# Patient Record
Sex: MALE | URBAN - METROPOLITAN AREA
[De-identification: names, ages, dates, MRNs, and addresses within clinical notes are randomized per-mention and may not be internally consistent; named-entity substitution may affect disease eponyms.]

---

## 2022-02-28 ENCOUNTER — IMPORTED ENCOUNTER (OUTPATIENT)
Dept: URBAN - METROPOLITAN AREA CLINIC 31 | Facility: CLINIC | Age: 27
End: 2022-02-28

## 2022-02-28 PROBLEM — H52.10: Noted: 2022-02-28

## 2022-02-28 PROCEDURE — 76514 ECHO EXAM OF EYE THICKNESS: CPT

## 2022-02-28 PROCEDURE — 92025 CPTRIZED CORNEAL TOPOGRAPHY: CPT

## 2022-02-28 NOTE — PATIENT DISCUSSION
We discussed risks vs. benefits of surgery. The patient has watched and understands the informed consent video. I have discussed the risks of laser refractive surgery with the patient. I informed the patient of the risk of infection (1:1000 for PRK and 1:3000 to 1:5000 for LASIK.)  I informed the patient of the possibility of complications related to creating the flap during LASIK surgery and that such complications may require that completion of their procedure be delayed for months. I have discussed the possibility of postoperative halos starbursts and dryness. I reviewed the risk of corneal ectasia. We discussed the possibility of need for enhancement surgery. I have discussed the need for postoperative reading glasses. I have discussed the possibility for the patient to require glasses and/or contact lenses postoperatively for the clearest vision possible. I have answered all of the patients questions. SCHEDULE PRK OU WITH AESPOD 1 and 3 with [facility] follow up with Jakub Headings - future increase in risk of POAG discussed. E-FORCE RUN TODAY. ( RX of Valium and Norco given today). Pt will call to schedule procedure.

## 2022-04-02 ASSESSMENT — VISUAL ACUITY
OD_SC: J1+
OS_SC: J1+
OS_SC: 20/20
OS_CC: 20/200
OD_SC: 20/20
OU_SC: J1+14''
OD_CC: 20/200